# Patient Record
Sex: FEMALE | Race: WHITE | NOT HISPANIC OR LATINO | Employment: UNEMPLOYED | URBAN - NONMETROPOLITAN AREA
[De-identification: names, ages, dates, MRNs, and addresses within clinical notes are randomized per-mention and may not be internally consistent; named-entity substitution may affect disease eponyms.]

---

## 2024-07-12 ENCOUNTER — OFFICE VISIT (OUTPATIENT)
Dept: FAMILY MEDICINE CLINIC | Age: 24
End: 2024-07-12
Payer: OTHER GOVERNMENT

## 2024-07-12 VITALS
SYSTOLIC BLOOD PRESSURE: 114 MMHG | HEIGHT: 66 IN | OXYGEN SATURATION: 95 % | DIASTOLIC BLOOD PRESSURE: 79 MMHG | BODY MASS INDEX: 41.69 KG/M2 | TEMPERATURE: 98.3 F | HEART RATE: 70 BPM | WEIGHT: 259.4 LBS

## 2024-07-12 DIAGNOSIS — E66.01 CLASS 3 SEVERE OBESITY WITHOUT SERIOUS COMORBIDITY WITH BODY MASS INDEX (BMI) OF 40.0 TO 44.9 IN ADULT, UNSPECIFIED OBESITY TYPE: ICD-10-CM

## 2024-07-12 DIAGNOSIS — R53.83 OTHER FATIGUE: Primary | ICD-10-CM

## 2024-07-12 DIAGNOSIS — F34.1 DYSTHYMIA: ICD-10-CM

## 2024-07-12 DIAGNOSIS — Z83.3 FAMILY HISTORY OF DIABETES MELLITUS: ICD-10-CM

## 2024-07-12 DIAGNOSIS — Z11.59 SCREENING FOR VIRAL DISEASE: ICD-10-CM

## 2024-07-12 NOTE — PROGRESS NOTES
Chief Complaint  Jayla Murrell presents to [facility] for Establish Care    Subjective          History of Present Illness    Jayla is here today to establish care.   She recently stopped vaping.   She reports that she had lab work done about 5-6 months ago. She reports that her A1C and TSH were normal. She had a mild elevation in liver enzymes. Was told that this should be monitored. Was supposed to follow up to have rechecked but had insurance change. She is asking about weight loss medication. She has struggled with her weight for as long as she can remember. She typically does not eat breakfast but drinks coffee with small amount of sugar and creamer. She does not eat again until around 2 pm. She typically eats a sandwich. She has recently started drinking water again. She has been working out sporadically.   Patient doesn't have a history of pancreatitis, family history of MEN syndrome, thyroid cancer, adrenal or pancreatic cancer. Patient understands this should not be taken if pregnant and it may cause gi upset or pancreatitis.  She was previously diagnosed with anxiety and depression. Had been to both [facility] and [facility] in the past. She was prescribed zoloft which seems to help. She had stopped taking but did restart zoloft a couple of weeks ago. Wishes to continue and desires refill.     Review of Systems      Allergies   Allergen Reactions    Iodinated Contrast Media Shortness Of Breath      Past Medical History:   Diagnosis Date    Anxiety     Chronic sinusitis     Depression     GERD (gastroesophageal reflux disease)      Current Outpatient Medications   Medication Sig Dispense Refill    Prenat MV-Min w/Fe-Folate-DHA (PRENATAL COMPLETE PO) Take  by mouth Daily.      sertraline (ZOLOFT) 50 MG tablet Take 1 tablet by mouth Daily. 90 tablet 1     No current facility-administered medications for this visit.     Past Surgical History:   Procedure Laterality Date     "COLONOSCOPY      SINUS SURGERY        Social History     Tobacco Use    Smoking status: Never    Smokeless tobacco: Never   Vaping Use    Vaping status: Every Day   Substance Use Topics    Alcohol use: Yes     Comment: 2 glasses wine a week    Drug use: Never     Family History   Problem Relation Age of Onset    Mental illness Mother     Liver disease Mother     Heart disease Mother     Kidney disease Mother     Hypertension Mother     Diabetes Mother     Thyroid disease Mother      Health Maintenance Due   Topic Date Due    HEPATITIS C SCREENING  Never done    ANNUAL PHYSICAL  Never done        There is no immunization history on file for this patient.     Objective     Vitals:    07/12/24 0822   BP: 114/79   BP Location: Right arm   Patient Position: Sitting   Pulse: 70   Temp: 98.3 °F (36.8 °C)   TempSrc: Oral   SpO2: 95%   Weight: 118 kg (259 lb 6.4 oz)   Height: 167.6 cm (66\")     Body mass index is 41.87 kg/m².   Class 3 Severe Obesity (BMI >=40). Obesity-related health conditions include the following: none. Obesity is newly identified. BMI is is above average; BMI management plan is completed. We discussed low calorie, low carb based diet program, increasing exercise, and an celeset-based approach such as [employer] Pal or Lose It.            No results found.    Physical Exam  Vitals reviewed.   Constitutional:       General: She is not in acute distress.     Appearance: Normal appearance. She is well-developed.   HENT:      Head: Normocephalic and atraumatic.   Cardiovascular:      Rate and Rhythm: Normal rate and regular rhythm.   Pulmonary:      Effort: Pulmonary effort is normal.      Breath sounds: Normal breath sounds.   Neurological:      Mental Status: She is alert and oriented to person, place, and time.   Psychiatric:         Mood and Affect: Mood and affect normal.           Result Review :                               Assessment and Plan      Assessment & Plan  Other fatigue  Rechecking labs  Family " history of diabetes mellitus  Checking A1C  Screening for viral disease  Hep C lab  Class 3 severe obesity without serious comorbidity with body mass index (BMI) of 40.0 to 44.9 in adult, unspecified obesity type  Patient's (Body mass index is 41.87 kg/m².) indicates that they are morbidly/severely obese (BMI > 40 or > 35 with obesity - related health condition) with health conditions that include none . Weight is newly identified. BMI  is above average; BMI management plan is completed. We discussed low calorie, low carb based diet program, increasing exercise, and an celeste-based approach such as [employer] Pal or Lose It.   She will call insurance regarding GLP-1 coverage. Will let me know if covered and rx can be given.   Dysthymia    Symptoms improved with zoloft and wishes to continue. Refill provided.     Orders Placed This Encounter   Procedures    Comprehensive metabolic panel    TSH Rfx On Abnormal To Free T4    Hemoglobin A1c    Vitamin B12    Hepatitis C antibody    CBC w AUTO Differential     New Medications Ordered This Visit   Medications    sertraline (ZOLOFT) 50 MG tablet     Sig: Take 1 tablet by mouth Daily.     Dispense:  90 tablet     Refill:  1                 Follow Up     Return in about 3 months (around 10/12/2024) for Annual physical.

## 2024-08-16 ENCOUNTER — TELEPHONE (OUTPATIENT)
Dept: FAMILY MEDICINE CLINIC | Age: 24
End: 2024-08-16

## 2024-08-26 ENCOUNTER — LAB (OUTPATIENT)
Dept: LAB | Facility: HOSPITAL | Age: 24
End: 2024-08-26
Payer: OTHER GOVERNMENT

## 2024-08-26 DIAGNOSIS — R53.83 OTHER FATIGUE: ICD-10-CM

## 2024-08-26 DIAGNOSIS — Z83.3 FAMILY HISTORY OF DIABETES MELLITUS: ICD-10-CM

## 2024-08-26 DIAGNOSIS — Z11.59 SCREENING FOR VIRAL DISEASE: ICD-10-CM

## 2024-08-26 LAB
BASOPHILS # BLD AUTO: 0.04 10*3/MM3 (ref 0–0.2)
BASOPHILS NFR BLD AUTO: 0.4 % (ref 0–1.5)
DEPRECATED RDW RBC AUTO: 42.5 FL (ref 37–54)
EOSINOPHIL # BLD AUTO: 0.19 10*3/MM3 (ref 0–0.4)
EOSINOPHIL NFR BLD AUTO: 1.8 % (ref 0.3–6.2)
ERYTHROCYTE [DISTWIDTH] IN BLOOD BY AUTOMATED COUNT: 13.4 % (ref 12.3–15.4)
HCT VFR BLD AUTO: 41.4 % (ref 34–46.6)
HGB BLD-MCNC: 13.1 G/DL (ref 12–15.9)
IMM GRANULOCYTES # BLD AUTO: 0.02 10*3/MM3 (ref 0–0.05)
IMM GRANULOCYTES NFR BLD AUTO: 0.2 % (ref 0–0.5)
LYMPHOCYTES # BLD AUTO: 2.27 10*3/MM3 (ref 0.7–3.1)
LYMPHOCYTES NFR BLD AUTO: 21.9 % (ref 19.6–45.3)
MCH RBC QN AUTO: 27.2 PG (ref 26.6–33)
MCHC RBC AUTO-ENTMCNC: 31.6 G/DL (ref 31.5–35.7)
MCV RBC AUTO: 86.1 FL (ref 79–97)
MONOCYTES # BLD AUTO: 0.58 10*3/MM3 (ref 0.1–0.9)
MONOCYTES NFR BLD AUTO: 5.6 % (ref 5–12)
NEUTROPHILS NFR BLD AUTO: 7.27 10*3/MM3 (ref 1.7–7)
NEUTROPHILS NFR BLD AUTO: 70.1 % (ref 42.7–76)
PLATELET # BLD AUTO: 278 10*3/MM3 (ref 140–450)
PMV BLD AUTO: 10 FL (ref 6–12)
RBC # BLD AUTO: 4.81 10*6/MM3 (ref 3.77–5.28)
WBC NRBC COR # BLD AUTO: 10.37 10*3/MM3 (ref 3.4–10.8)

## 2024-08-26 PROCEDURE — 82607 VITAMIN B-12: CPT

## 2024-08-26 PROCEDURE — 80053 COMPREHEN METABOLIC PANEL: CPT

## 2024-08-26 PROCEDURE — 36415 COLL VENOUS BLD VENIPUNCTURE: CPT

## 2024-08-26 PROCEDURE — 86803 HEPATITIS C AB TEST: CPT

## 2024-08-26 PROCEDURE — 83036 HEMOGLOBIN GLYCOSYLATED A1C: CPT

## 2024-08-26 PROCEDURE — 84443 ASSAY THYROID STIM HORMONE: CPT

## 2024-08-26 PROCEDURE — 85025 COMPLETE CBC W/AUTO DIFF WBC: CPT

## 2024-08-27 ENCOUNTER — TELEPHONE (OUTPATIENT)
Dept: FAMILY MEDICINE CLINIC | Age: 24
End: 2024-08-27
Payer: OTHER GOVERNMENT

## 2024-08-27 DIAGNOSIS — D49.1 NASAL SINUS TUMOR: Primary | ICD-10-CM

## 2024-08-27 LAB
ALBUMIN SERPL-MCNC: 4.3 G/DL (ref 3.5–5.2)
ALBUMIN/GLOB SERPL: 1.4 G/DL
ALP SERPL-CCNC: 164 U/L (ref 39–117)
ALT SERPL W P-5'-P-CCNC: 26 U/L (ref 1–33)
ANION GAP SERPL CALCULATED.3IONS-SCNC: 12.4 MMOL/L (ref 5–15)
AST SERPL-CCNC: 17 U/L (ref 1–32)
BILIRUB SERPL-MCNC: 0.5 MG/DL (ref 0–1.2)
BUN SERPL-MCNC: 6 MG/DL (ref 6–20)
BUN/CREAT SERPL: 8.6 (ref 7–25)
CALCIUM SPEC-SCNC: 9.1 MG/DL (ref 8.6–10.5)
CHLORIDE SERPL-SCNC: 103 MMOL/L (ref 98–107)
CO2 SERPL-SCNC: 20.6 MMOL/L (ref 22–29)
CREAT SERPL-MCNC: 0.7 MG/DL (ref 0.57–1)
EGFRCR SERPLBLD CKD-EPI 2021: 124 ML/MIN/1.73
GLOBULIN UR ELPH-MCNC: 3.1 GM/DL
GLUCOSE SERPL-MCNC: 87 MG/DL (ref 65–99)
HBA1C MFR BLD: 5.2 % (ref 4.8–5.6)
HCV AB SER QL: NORMAL
POTASSIUM SERPL-SCNC: 3.9 MMOL/L (ref 3.5–5.2)
PROT SERPL-MCNC: 7.4 G/DL (ref 6–8.5)
SODIUM SERPL-SCNC: 136 MMOL/L (ref 136–145)
TSH SERPL DL<=0.05 MIU/L-ACNC: 1.92 UIU/ML (ref 0.27–4.2)
VIT B12 BLD-MCNC: 506 PG/ML (ref 211–946)

## 2024-08-27 NOTE — TELEPHONE ENCOUNTER
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 FABIOLA ANN    What is the office location: [address] [place] [address]    What is the office phone number: [phone]    Any additional details: PATIENT HAS BEEN SEEING THIS PROVIDER FOR SEVERAL YEARS AND  NEEDS A REFERRAL.

## 2024-10-22 ENCOUNTER — TELEPHONE (OUTPATIENT)
Dept: FAMILY MEDICINE CLINIC | Age: 24
End: 2024-10-22
Payer: OTHER GOVERNMENT

## 2024-10-22 NOTE — TELEPHONE ENCOUNTER
Caller: Jayla Murrell    Relationship: Self    Best call back number: [phone]     What is the medical concern/diagnosis: YEARLY ANNUAL AND TRYING TO HAVE A BABY.     What specialty or service is being requested: OB/GYN    What is the provider, practice or medical service name: [facility]     What is the office location: [address] [place] [address] MEDICAL OFFICE BUILDING A [address].    What is the office phone number: [phone]    Any additional details: OFFICE REQUIRES INSURANCE REFERRAL/AUTHORIZATION TO BE SENT.    PATIENT WOULD LIKE SEVERAL VISITS AUTHORIZED TO  IN ONE CALENDAR YEAR.    ONE VISIT FOR ANNUAL AND SEVERAL   FOLLOW [employer] FOR TRYING TO CONCEIVE.    HUB UPDATED REGISTRATION.      DELETE AFTER READING TO PATIENT: “ Thank you for sharing this information. I will send a message to the clinical team. Please allow 48 hours for the clinical staff to follow up on this request.”

## 2024-11-07 DIAGNOSIS — Z12.4 CERVICAL CANCER SCREENING: Primary | ICD-10-CM

## 2024-12-05 ENCOUNTER — OFFICE VISIT (OUTPATIENT)
Dept: FAMILY MEDICINE CLINIC | Age: 24
End: 2024-12-05
Payer: OTHER GOVERNMENT

## 2024-12-05 ENCOUNTER — HOSPITAL ENCOUNTER (OUTPATIENT)
Dept: GENERAL RADIOLOGY | Facility: HOSPITAL | Age: 24
Discharge: HOME | End: 2024-12-05
Admitting: NURSE PRACTITIONER
Payer: OTHER GOVERNMENT

## 2024-12-05 VITALS
BODY MASS INDEX: 44.52 KG/M2 | TEMPERATURE: 99.1 F | SYSTOLIC BLOOD PRESSURE: 139 MMHG | DIASTOLIC BLOOD PRESSURE: 87 MMHG | WEIGHT: 277 LBS | OXYGEN SATURATION: 97 % | HEIGHT: 66 IN | HEART RATE: 76 BPM

## 2024-12-05 DIAGNOSIS — M25.561 PAIN IN BOTH KNEES, UNSPECIFIED CHRONICITY: ICD-10-CM

## 2024-12-05 DIAGNOSIS — Z23 ENCOUNTER FOR IMMUNIZATION: ICD-10-CM

## 2024-12-05 DIAGNOSIS — M25.562 PAIN IN BOTH KNEES, UNSPECIFIED CHRONICITY: ICD-10-CM

## 2024-12-05 DIAGNOSIS — Z00.00 ANNUAL PHYSICAL EXAM: Primary | ICD-10-CM

## 2024-12-05 PROCEDURE — 90471 IMMUNIZATION ADMIN: CPT | Performed by: NURSE PRACTITIONER

## 2024-12-05 PROCEDURE — 99395 PREV VISIT EST AGE 18-39: CPT | Performed by: NURSE PRACTITIONER

## 2024-12-05 PROCEDURE — 90656 IIV3 VACC NO PRSV 0.5 ML IM: CPT | Performed by: NURSE PRACTITIONER

## 2024-12-05 PROCEDURE — 73560 X-RAY EXAM OF KNEE 1 OR 2: CPT

## 2024-12-05 NOTE — PROGRESS NOTES
Chief Complaint  Jayla Murrell presents to [facility] for Annual Exam    Subjective          History of Present Illness    Jayla is here today for annual preventive exam.  Wishes to update influenza vaccine today.   Reports allergy to HPV vaccine.   Declines covid, PNA, Tdap vaccines.   Reports pap smear UTD 12/5/22.  She has appt scheduled with GYN on 12/12/24. She would like to conceive. She is currently on her menstrual cycle.  is in the  and will be coming home for [place].   She reports that she started going to the gym about 2 months ago.   Never smoker.     She has been going to [facility] in [facility]. She has recently restarted therapy. She had her first visit with Tomasa earlier this week. Not currently on any medications but may be starting. She has been diagnosed with depression, anxiety, PTSD, childhood trauma.   C/o knee pain. She notes that they pop and crack. Her mom had to have knee surgery in her early 40s and she is worried about family history. Worse with walking up steps.     Review of Systems   Constitutional:  Negative for chills and fever.   HENT:  Negative for ear pain and sore throat.    Eyes:  Negative for blurred vision and redness.   Respiratory:  Negative for shortness of breath and wheezing.    Cardiovascular:  Negative for chest pain and palpitations.   Gastrointestinal:  Negative for abdominal pain and vomiting.   Genitourinary:  Negative for frequency and urgency.   Musculoskeletal:         Bilateral knee pain   Skin:  Negative for rash.   Neurological:  Negative for seizures and syncope.   Psychiatric/Behavioral:  Negative for suicidal ideas and depressed mood.          Allergies   Allergen Reactions    Iodinated Contrast Media Shortness Of Breath    Hpv Bivalent [Human Papillomavirus 2-Valent Recombinant Vaccine] Swelling      Past Medical History:   Diagnosis Date    Anxiety     Chronic sinusitis     Depression     GERD  "(gastroesophageal reflux disease)      Current Outpatient Medications   Medication Sig Dispense Refill    Prenat MV-Min w/Fe-Folate-DHA (PRENATAL COMPLETE PO) Take  by mouth Daily.       No current facility-administered medications for this visit.     Past Surgical History:   Procedure Laterality Date    COLONOSCOPY      SINUS SURGERY        Social History     Tobacco Use    Smoking status: Never     Passive exposure: Never    Smokeless tobacco: Never   Vaping Use    Vaping status: Former    Substances: Nicotine, Flavoring    Devices: Disposable   Substance Use Topics    Alcohol use: Yes     Comment: 2 glasses wine a week    Drug use: Never     Family History   Problem Relation Age of Onset    Mental illness Mother     Liver disease Mother     Heart disease Mother     Kidney disease Mother     Hypertension Mother     Diabetes Mother     Thyroid disease Mother      There are no preventive care reminders to display for this patient.   Immunization History   Administered Date(s) Administered    Fluzone  >6mos 12/05/2024        Objective     Vitals:    12/05/24 1408   BP: 139/87   Pulse: 76   Temp: 99.1 °F (37.3 °C)   TempSrc: Oral   SpO2: 97%   Weight: 126 kg (277 lb)   Height: 167.6 cm (66\")     Body mass index is 44.71 kg/m².                No results found.    Physical Exam  Vitals reviewed.   Constitutional:       General: She is not in acute distress.     Appearance: Normal appearance. She is well-developed.   HENT:      Head: Normocephalic and atraumatic.      Right Ear: Hearing, tympanic membrane and ear canal normal.      Left Ear: Hearing, tympanic membrane and ear canal normal.      Mouth/Throat:      Mouth: Mucous membranes are moist.   Eyes:      Extraocular Movements: Extraocular movements intact.      Pupils: Pupils are equal, round, and reactive to light.   Cardiovascular:      Rate and Rhythm: Normal rate and regular rhythm.      Pulses: Normal pulses.      Heart sounds: Normal heart sounds. "   Pulmonary:      Effort: Pulmonary effort is normal.      Breath sounds: Normal breath sounds.   Abdominal:      General: Bowel sounds are normal.      Palpations: Abdomen is soft.      Tenderness: There is no abdominal tenderness.   Musculoskeletal:         General: Normal range of motion.      Cervical back: Normal range of motion and neck supple.   Skin:     General: Skin is warm and dry.   Neurological:      Mental Status: She is alert and oriented to person, place, and time.   Psychiatric:         Mood and Affect: Mood normal.           Result Review :                               Assessment and Plan      Assessment & Plan  Annual physical exam  Appropriate screenings and vaccinations were reviewed with the pt and offered as indicated.  Pt counseled on healthy lifestyle including healthy diet, exercise.         Pain in both knees, unspecified chronicity  Will obtain imaging. Will also get her set up for PT.   Orders:    XR Knee 1 or 2 View Right; Future    XR Knee 1 or 2 View Left; Future    Ambulatory Referral to Physical Therapy for Evaluation & Treatment    Encounter for immunization  Updating influenza vaccine.   Orders:    Fluzone >6mos              Follow Up     Return in about 1 year (around 12/5/2025) for Annual physical.

## 2024-12-19 ENCOUNTER — TELEPHONE (OUTPATIENT)
Dept: FAMILY MEDICINE CLINIC | Age: 24
End: 2024-12-19

## 2024-12-19 ENCOUNTER — OFFICE VISIT (OUTPATIENT)
Dept: FAMILY MEDICINE CLINIC | Age: 24
End: 2024-12-19
Payer: OTHER GOVERNMENT

## 2024-12-19 VITALS
DIASTOLIC BLOOD PRESSURE: 62 MMHG | SYSTOLIC BLOOD PRESSURE: 130 MMHG | HEART RATE: 72 BPM | BODY MASS INDEX: 44.2 KG/M2 | HEIGHT: 66 IN | TEMPERATURE: 98.1 F | WEIGHT: 275 LBS | OXYGEN SATURATION: 99 %

## 2024-12-19 DIAGNOSIS — G56.01 CARPAL TUNNEL SYNDROME ON RIGHT: Primary | ICD-10-CM

## 2024-12-19 DIAGNOSIS — M25.561 PAIN IN BOTH KNEES, UNSPECIFIED CHRONICITY: ICD-10-CM

## 2024-12-19 DIAGNOSIS — M25.562 PAIN IN BOTH KNEES, UNSPECIFIED CHRONICITY: ICD-10-CM

## 2024-12-19 DIAGNOSIS — K21.9 GASTROESOPHAGEAL REFLUX DISEASE, UNSPECIFIED WHETHER ESOPHAGITIS PRESENT: ICD-10-CM

## 2024-12-19 PROCEDURE — 99214 OFFICE O/P EST MOD 30 MIN: CPT | Performed by: NURSE PRACTITIONER

## 2024-12-19 RX ORDER — OMEPRAZOLE 40 MG/1
40 CAPSULE, DELAYED RELEASE ORAL DAILY
Qty: 90 CAPSULE | Refills: 3 | Status: SHIPPED | OUTPATIENT
Start: 2024-12-19

## 2024-12-19 NOTE — PROGRESS NOTES
Chief Complaint  Jayla Murrell presents to [facility] for Right wrist numbness  (Every morning ) and Heartburn    Subjective          History of Present Illness    Jayla is here today with c/o right hand pain and numbness when waking up in the mornings. First noticed a couple of weeks ago. She sleeps with her hands curled up. It feels like it would after sitting on it for long periods of time.   She c/o heartburn. Reports that she had EGD with Dr Siddiqi in 2017. She reports that she was hospitalized last year at [facility] with gastritis. Diagnosed with GERD. Not currently taking any medications.   She has started PT for her knees. She has been doing exercises at home. She was advised to see orthopedics.     Review of Systems      Allergies   Allergen Reactions    Iodinated Contrast Media Shortness Of Breath    Hpv Bivalent [Human Papillomavirus 2-Valent Recombinant Vaccine] Swelling      Past Medical History:   Diagnosis Date    Anxiety     Chronic sinusitis     Depression     GERD (gastroesophageal reflux disease)      Current Outpatient Medications   Medication Sig Dispense Refill    Prenat MV-Min w/Fe-Folate-DHA (PRENATAL COMPLETE PO) Take  by mouth Daily.      omeprazole (priLOSEC) 40 MG capsule Take 1 capsule by mouth Daily. 90 capsule 3     No current facility-administered medications for this visit.     Past Surgical History:   Procedure Laterality Date    COLONOSCOPY      SINUS SURGERY        Social History     Tobacco Use    Smoking status: Never     Passive exposure: Never    Smokeless tobacco: Never   Vaping Use    Vaping status: Former    Substances: Nicotine, Flavoring    Devices: Disposable   Substance Use Topics    Alcohol use: Yes     Comment: 2 glasses wine a week    Drug use: Never     Family History   Problem Relation Age of Onset    Mental illness Mother     Liver disease Mother     Heart disease Mother     Kidney disease Mother     Hypertension Mother      "Diabetes Mother     Thyroid disease Mother      Health Maintenance Due   Topic Date Due    COVID-19 Vaccine (1 - 2024-25 season) Never done      Immunization History   Administered Date(s) Administered    Fluzone  >6mos 12/05/2024        Objective     Vitals:    12/19/24 1127   BP: 130/62   BP Location: Right arm   Patient Position: Sitting   Pulse: 72   Temp: 98.1 °F (36.7 °C)   TempSrc: Oral   SpO2: 99%   Weight: 125 kg (275 lb)   Height: 167.6 cm (65.98\")     Body mass index is 44.41 kg/m².                No results found.    Physical Exam  Vitals reviewed.   Constitutional:       General: She is not in acute distress.     Appearance: Normal appearance. She is well-developed.   HENT:      Head: Normocephalic and atraumatic.   Cardiovascular:      Rate and Rhythm: Normal rate and regular rhythm.   Pulmonary:      Effort: Pulmonary effort is normal.      Breath sounds: Normal breath sounds.   Musculoskeletal:      Comments: +phalens test   Neurological:      Mental Status: She is alert and oriented to person, place, and time.   Psychiatric:         Mood and Affect: Mood and affect normal.           Result Review :                               Assessment and Plan      Assessment & Plan  Carpal tunnel syndrome on right  Carpal tunnel brace to wear at night.        Gastroesophageal reflux disease, unspecified whether esophagitis present  Lifestyle/dietary changes reviewed. Will start daily PPI. Will request EGD records.   Orders:    omeprazole (priLOSEC) 40 MG capsule; Take 1 capsule by mouth Daily.    Pain in both knees, unspecified chronicity  Continue PT. Ortho referral placed.  Orders:    Ambulatory Referral to Orthopedic Surgery              Follow Up     No follow-ups on file.             "

## 2025-01-14 ENCOUNTER — TELEPHONE (OUTPATIENT)
Dept: FAMILY MEDICINE CLINIC | Age: 25
End: 2025-01-14
Payer: OTHER GOVERNMENT

## 2025-01-14 NOTE — TELEPHONE ENCOUNTER
Caller: Jayla Murrell    Relationship to patient: Self    Best call back number: [phone]    REQUESTING A COPY OF XRAY FOR RIGHT AND LEFT KNEE.    PLEASE CALL PATIENT WHEN READY FOR .

## 2025-01-23 ENCOUNTER — TELEPHONE (OUTPATIENT)
Dept: FAMILY MEDICINE CLINIC | Age: 25
End: 2025-01-23
Payer: OTHER GOVERNMENT

## 2025-01-23 DIAGNOSIS — M25.562 PAIN IN BOTH KNEES, UNSPECIFIED CHRONICITY: Primary | ICD-10-CM

## 2025-01-23 DIAGNOSIS — M25.561 PAIN IN BOTH KNEES, UNSPECIFIED CHRONICITY: Primary | ICD-10-CM

## 2025-01-23 NOTE — TELEPHONE ENCOUNTER
Caller: Jayla Murrell    Relationship: Self    Best call back number: [phone]     What is the medical concern/diagnosis: LEFT AND RIGHT KNEE ISSUES     What specialty or service is being requested: ORTHOPEDIC SPECIALISTS    What is the provider, practice or medical service name: [facility] [place], [place]     What is the office location: [address] [place] [address], [facility]    What is the office phone number: [phone]    Any additional details: PATIENT STATES DR DOWLING IS HER CURRENT ORTHO AND HE IS A GOOD DR BUT HER MOTHER GOES TO THIS FACILITY SO SHE WOULD LIKE TO GO THERE AS WELL.